# Patient Record
Sex: FEMALE
[De-identification: names, ages, dates, MRNs, and addresses within clinical notes are randomized per-mention and may not be internally consistent; named-entity substitution may affect disease eponyms.]

---

## 2021-06-27 ENCOUNTER — NURSE TRIAGE (OUTPATIENT)
Dept: OTHER | Facility: CLINIC | Age: 61
End: 2021-06-27

## 2021-06-27 NOTE — TELEPHONE ENCOUNTER
Brief description of triage: States she did an over the counter urine test which showed she had a UTI. She declines triage. She was hoping to get guidance where to go with her insurance to get an antibiotic. Reviewed [facility] Network facilities where she is at, [facility], [address] [facility], and closest was [place] miles. Gave her information for [employer] [facility]erson. Care advice provided, patient verbalizes understanding; denies any other questions or concerns; instructed to call back for any new or worsening symptoms. This triage is a result of a call to [address] [place] [address]. Please do not respond to the triage nurse through this encounter. Any subsequent communication should be directly with the patient.       Reason for Disposition   General information question, no triage required and triager able to answer question    Protocols used: INFORMATION ONLY CALL - NO TRIAGE-ADULT-